# Patient Record
Sex: FEMALE | Race: WHITE | NOT HISPANIC OR LATINO | Employment: STUDENT | URBAN - METROPOLITAN AREA
[De-identification: names, ages, dates, MRNs, and addresses within clinical notes are randomized per-mention and may not be internally consistent; named-entity substitution may affect disease eponyms.]

---
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
## 2020-11-06 ENCOUNTER — OFFICE VISIT (OUTPATIENT)
Dept: URGENT CARE | Facility: CLINIC | Age: 20
End: 2020-11-06
Payer: COMMERCIAL

## 2020-11-06 VITALS
RESPIRATION RATE: 20 BRPM | HEIGHT: 71 IN | OXYGEN SATURATION: 97 % | WEIGHT: 146 LBS | HEART RATE: 96 BPM | BODY MASS INDEX: 20.44 KG/M2 | TEMPERATURE: 98.3 F

## 2020-11-06 DIAGNOSIS — Z11.59 SPECIAL SCREENING EXAMINATION FOR UNSPECIFIED VIRAL DISEASE: Primary | ICD-10-CM

## 2020-11-06 PROCEDURE — G0382 LEV 3 HOSP TYPE B ED VISIT: HCPCS | Performed by: PHYSICIAN ASSISTANT

## 2020-11-06 PROCEDURE — U0003 INFECTIOUS AGENT DETECTION BY NUCLEIC ACID (DNA OR RNA); SEVERE ACUTE RESPIRATORY SYNDROME CORONAVIRUS 2 (SARS-COV-2) (CORONAVIRUS DISEASE [COVID-19]), AMPLIFIED PROBE TECHNIQUE, MAKING USE OF HIGH THROUGHPUT TECHNOLOGIES AS DESCRIBED BY CMS-2020-01-R: HCPCS | Performed by: PHYSICIAN ASSISTANT

## 2020-11-08 LAB — SARS-COV-2 RNA SPEC QL NAA+PROBE: NOT DETECTED

## 2020-11-09 ENCOUNTER — TELEPHONE (OUTPATIENT)
Dept: URGENT CARE | Facility: CLINIC | Age: 20
End: 2020-11-09

## 2021-12-15 ENCOUNTER — OFFICE VISIT (OUTPATIENT)
Dept: FAMILY MEDICINE CLINIC | Facility: CLINIC | Age: 21
End: 2021-12-15
Payer: COMMERCIAL

## 2021-12-15 VITALS
BODY MASS INDEX: 18.27 KG/M2 | SYSTOLIC BLOOD PRESSURE: 110 MMHG | RESPIRATION RATE: 16 BRPM | HEART RATE: 88 BPM | HEIGHT: 72 IN | WEIGHT: 134.9 LBS | DIASTOLIC BLOOD PRESSURE: 62 MMHG

## 2021-12-15 DIAGNOSIS — N94.6 DYSMENORRHEA: ICD-10-CM

## 2021-12-15 DIAGNOSIS — Z00.00 ANNUAL PHYSICAL EXAM: Primary | ICD-10-CM

## 2021-12-15 PROBLEM — J45.909 ASTHMA: Status: RESOLVED | Noted: 2017-01-16 | Resolved: 2021-12-15

## 2021-12-15 PROCEDURE — 99395 PREV VISIT EST AGE 18-39: CPT | Performed by: PHYSICIAN ASSISTANT

## 2021-12-15 RX ORDER — NORETHINDRONE ACETATE AND ETHINYL ESTRADIOL 1MG-20(21)
1 KIT ORAL DAILY
Qty: 84 TABLET | Refills: 3 | Status: SHIPPED | OUTPATIENT
Start: 2021-12-15

## 2022-09-11 ENCOUNTER — OFFICE VISIT (OUTPATIENT)
Dept: URGENT CARE | Facility: CLINIC | Age: 22
End: 2022-09-11
Payer: COMMERCIAL

## 2022-09-11 VITALS
SYSTOLIC BLOOD PRESSURE: 116 MMHG | HEIGHT: 72 IN | HEART RATE: 116 BPM | RESPIRATION RATE: 18 BRPM | WEIGHT: 126.6 LBS | BODY MASS INDEX: 17.15 KG/M2 | OXYGEN SATURATION: 97 % | TEMPERATURE: 102.3 F | DIASTOLIC BLOOD PRESSURE: 72 MMHG

## 2022-09-11 DIAGNOSIS — J02.9 SORE THROAT: Primary | ICD-10-CM

## 2022-09-11 LAB
S PYO AG THROAT QL: NEGATIVE
SARS-COV-2 AG UPPER RESP QL IA: NEGATIVE
VALID CONTROL: NORMAL

## 2022-09-11 PROCEDURE — 87811 SARS-COV-2 COVID19 W/OPTIC: CPT

## 2022-09-11 PROCEDURE — 87070 CULTURE OTHR SPECIMN AEROBIC: CPT | Performed by: PHYSICIAN ASSISTANT

## 2022-09-11 PROCEDURE — 99213 OFFICE O/P EST LOW 20 MIN: CPT | Performed by: PHYSICIAN ASSISTANT

## 2022-09-11 PROCEDURE — 87147 CULTURE TYPE IMMUNOLOGIC: CPT | Performed by: PHYSICIAN ASSISTANT

## 2022-09-11 RX ORDER — AZITHROMYCIN 250 MG/1
TABLET, FILM COATED ORAL
Qty: 6 TABLET | Refills: 0 | Status: SHIPPED | OUTPATIENT
Start: 2022-09-11 | End: 2022-09-15

## 2022-09-11 NOTE — PROGRESS NOTES
NAME: Carrie Chaudhary is a 24 y o  female  : 2000    MRN: [id number]      Assessment and Plan   Sore throat [J02 9]  1  Sore throat  POCT rapid strepA    Poct Covid 19 Rapid Antigen Test    Throat culture    azithromycin (ZITHROMAX) 250 mg tablet      rapid strep negative, culture sent  COVID rapid negative    Based on [facility] criteria will treat with azithromycin  Discussed over-the-counter medications, fluids and rest   If no improvement follow-up PCP  ER if any changes or worsens  She acknowledges      Patient Instructions     Patient Instructions   Azithromycin as directed  Over-the-counter medications, fluids and rest   If no improvement follow-up with PCP    Proceed to ER if symptoms worsen  Chief Complaint     Chief Complaint   Patient presents with    Sore Throat     Pt reports sore throat/fever/body aches x 1d-denies n/v/diarrhea         History of Present Illness   Patient with no reported past medical history presents complaining of sore throat x1 day  States yesterday started with fevers, chills, body aches and sore throat  Denies any cough or congestion  Able to swallow the pain with swallowing  Over-the-counter medications not helping  Review of Systems   Review of Systems   Constitutional: Positive for chills and fever  HENT: Positive for sore throat  Negative for congestion  Respiratory: Negative for cough and shortness of breath  Cardiovascular: Negative for chest pain  Gastrointestinal: Negative for diarrhea, nausea and vomiting  Neurological: Negative for dizziness, weakness and light-headedness           Current Medications       Current Outpatient Medications:     azithromycin (ZITHROMAX) 250 mg tablet, Take 2 tablets today then 1 tablet daily x 4 days, Disp: 6 tablet, Rfl: 0    norethindrone-ethinyl estradiol (Loestrin Fe ) 1-20 MG-MCG per tablet, Take 1 tablet by mouth daily (Patient not taking: Reported on 2022), Disp: 84 tablet, Rfl: 3    Current Allergies     Allergies as of 09/11/2022 - Reviewed 09/11/2022   Allergen Reaction Noted    Molds & smuts  01/16/2017    Penicillins Rash 07/17/2018    Pollen extract  01/16/2017              Past Medical History:   Diagnosis Date    Pneumonia        History reviewed  No pertinent surgical history  Family History   Problem Relation Age of Onset    No Known Problems Mother     Hypertension Father     Alcohol abuse Father     Substance Abuse Neg Hx     Mental illness Neg Hx          Medications have been verified  The following portions of the patient's history were reviewed and updated as appropriate: allergies, current medications, past family history, past medical history, past social history, past surgical history and problem list     Objective   /72   Pulse (!) 116   Temp (!) 102 3 °F (39 1 °C)   Resp 18   Ht 6' (1 829 m)   Wt 57 4 kg (126 lb 9 6 oz)   SpO2 97%   BMI 17 17 kg/m²      Physical Exam     Physical Exam  Vitals and nursing note reviewed  Constitutional:       General: She is not in acute distress  Appearance: She is well-developed  She is not ill-appearing, toxic-appearing or diaphoretic  HENT:      Head:      Comments: TMs erythematous bilaterally without injection or bulging  Posterior oropharynx is erythematous with 2+ hypertrophic tonsils with white exudates  Uvula midline  Soft palate equal   Neck:      Comments: Bilateral tonsillar adenopathy  Cardiovascular:      Rate and Rhythm: Normal rate and regular rhythm  Pulmonary:      Effort: Pulmonary effort is normal  No respiratory distress  Musculoskeletal:      Cervical back: Normal range of motion  Skin:     Capillary Refill: Capillary refill takes less than 2 seconds  Neurological:      Mental Status: She is alert and oriented to person, place, and time

## 2022-09-11 NOTE — PATIENT INSTRUCTIONS
Azithromycin as directed  Over-the-counter medications, fluids and rest   If no improvement follow-up with PCP

## 2022-09-15 LAB — BACTERIA THROAT CULT: ABNORMAL

## 2022-09-20 ENCOUNTER — OFFICE VISIT (OUTPATIENT)
Dept: URGENT CARE | Facility: CLINIC | Age: 22
End: 2022-09-20
Payer: COMMERCIAL

## 2022-09-20 VITALS
RESPIRATION RATE: 16 BRPM | SYSTOLIC BLOOD PRESSURE: 134 MMHG | DIASTOLIC BLOOD PRESSURE: 78 MMHG | OXYGEN SATURATION: 99 % | HEART RATE: 104 BPM

## 2022-09-20 DIAGNOSIS — R50.9 FEVER, UNSPECIFIED FEVER CAUSE: Primary | ICD-10-CM

## 2022-09-20 DIAGNOSIS — J02.9 SORE THROAT: ICD-10-CM

## 2022-09-20 LAB
S PYO AG THROAT QL: NEGATIVE
SARS-COV-2 AG UPPER RESP QL IA: NEGATIVE
VALID CONTROL: NORMAL

## 2022-09-20 PROCEDURE — 87880 STREP A ASSAY W/OPTIC: CPT | Performed by: PHYSICIAN ASSISTANT

## 2022-09-20 PROCEDURE — 87070 CULTURE OTHR SPECIMN AEROBIC: CPT | Performed by: PHYSICIAN ASSISTANT

## 2022-09-20 PROCEDURE — 99213 OFFICE O/P EST LOW 20 MIN: CPT | Performed by: PHYSICIAN ASSISTANT

## 2022-09-20 PROCEDURE — 87811 SARS-COV-2 COVID19 W/OPTIC: CPT | Performed by: PHYSICIAN ASSISTANT

## 2022-09-20 RX ORDER — METHYLPREDNISOLONE 4 MG/1
TABLET ORAL
Qty: 1 EACH | Refills: 0 | Status: SHIPPED | OUTPATIENT
Start: 2022-09-20 | End: 2022-09-26 | Stop reason: ALTCHOICE

## 2022-09-20 RX ORDER — CLINDAMYCIN HYDROCHLORIDE 300 MG/1
300 CAPSULE ORAL 3 TIMES DAILY
Qty: 21 CAPSULE | Refills: 0 | Status: SHIPPED | OUTPATIENT
Start: 2022-09-20 | End: 2022-09-26 | Stop reason: ALTCHOICE

## 2022-09-20 NOTE — PATIENT INSTRUCTIONS
Patient was told rapid strep is negative will send for culture  Patient was educated I will prescribe steroids and antibiotics  Patient was told to eat on antibiotics  IF symptoms persist I recommend follow up with PCP  Pharyngitis   WHAT YOU NEED TO KNOW:   Pharyngitis, or sore throat, is inflammation of the tissues and structures in your pharynx (throat)  Pharyngitis is most often caused by bacteria  It may also be caused by a cold or flu virus  Other causes include smoking, allergies, or acid reflux  DISCHARGE INSTRUCTIONS:   Call 911 for any of the following: You have trouble breathing or swallowing because your throat is swollen or sore  Return to the emergency department if:   You are drooling because it hurts too much to swallow  Your fever is higher than 102? F (39?C) or lasts longer than 3 days  You are confused  You taste blood in your throat  Contact your healthcare provider if:   Your throat pain gets worse  You have a painful lump in your throat that does not go away after 5 days  Your symptoms do not improve after 5 days  You have questions or concerns about your condition or care  Medicines:  Viral pharyngitis will go away on its own without treatment  Your sore throat should start to feel better in 3 to 5 days for both viral and bacterial infections  You may need any of the following:  Antibiotics  treat a bacterial infection  NSAIDs , such as ibuprofen, help decrease swelling, pain, and fever  NSAIDs can cause stomach bleeding or kidney problems in certain people  If you take blood thinner medicine, always ask your healthcare provider if NSAIDs are safe for you  Always read the medicine label and follow directions  Acetaminophen  decreases pain and fever  It is available without a doctor's order  Ask how much to take and how often to take it  Follow directions  Acetaminophen can cause liver damage if not taken correctly  Take your medicine as directed  Contact your healthcare provider if you think your medicine is not helping or if you have side effects  Tell him or her if you are allergic to any medicine  Keep a list of the medicines, vitamins, and herbs you take  Include the amounts, and when and why you take them  Bring the list or the pill bottles to follow-up visits  Carry your medicine list with you in case of an emergency  Manage your symptoms:   Gargle salt water  Mix ¼ teaspoon salt in an 8 ounce glass of warm water and gargle  This may help decrease swelling in your throat  Drink liquids as directed  You may need to drink more liquids than usual  Liquids may help soothe your throat and prevent dehydration  Ask how much liquid to drink each day and which liquids are best for you  Use a cool-steam humidifier  to help moisten the air in your room and calm your cough  Soothe your throat  with cough drops, ice, soft foods, or popsicles  Prevent the spread of pharyngitis:  Cover your mouth and nose when you cough or sneeze  Do not share food or drinks  Wash your hands often  Use soap and water  If soap and water are unavailable, use an alcohol based hand   Follow up with your doctor as directed:  Write down your questions so you remember to ask them during your visits  © Copyright [employer] 2022 Information is for End User's use only and may not be sold, redistributed or otherwise used for commercial purposes  All illustrations and images included in CareNotes® are the copyrighted property of [employer]  or [employer] Law[employer]  The above information is an  only  It is not intended as medical advice for individual conditions or treatments  Talk to your doctor, nurse or pharmacist before following any medical regimen to see if it is safe and effective for you

## 2022-09-20 NOTE — LETTER
September 20, 2022     Patient: Tisha Gonzalez   YOB: 2000   Date of Visit: 9/20/2022       To Whom It May Concern: It is my medical opinion that Mckenzie Rico may return to work on once fever free for 24 hours  If you have any questions or concerns, please don't hesitate to call           Sincerely,        Elisa Nguyen PA-C    CC: No Recipients

## 2022-09-20 NOTE — PROGRESS NOTES
[facility]        NAME: Ken Gamez is a 24 y o  female  : 2000    MRN: [id number]  DATE: 2022  TIME: 4:53 PM    Assessment and Plan   Fever, unspecified fever cause [R50 9]  1  Fever, unspecified fever cause  Poct Covid 19 Rapid Antigen Test    Throat culture    clindamycin (CLEOCIN) 300 MG capsule    methylPREDNISolone 4 MG tablet therapy pack   2  Sore throat  POCT rapid strepA    Throat culture    clindamycin (CLEOCIN) 300 MG capsule    methylPREDNISolone 4 MG tablet therapy pack       Rapid COVID 19 and Rapid strep negative      Patient Instructions       Patient was told rapid strep is negative will send for culture  Patient was educated I will prescribe steroids and antibiotics  Patient was told to eat on antibiotics  IF symptoms persist I recommend follow up with PCP  Chief Complaint     Chief Complaint   Patient presents with    Sore Throat     Pt seen , positive strep A culture  Given abx, last dose Friday  Fever (101) and mild sore throat returned  History of Present Illness       Patient is here today complaining of sore throat pain  Patient reports sore throat started on 22 patient reports she took a Z-pack and had relief until yesterday  Patient reports sore throat pain returned  Patient is allergic to Pencillin  Denies any shortness of breath or trouble swallowing  Denies any history of diabetes      Review of Systems   Review of Systems   Constitutional: Positive for fever  HENT: Positive for congestion and sore throat  Respiratory: Negative  Cardiovascular: Negative  Neurological: Negative  Psychiatric/Behavioral: Negative            Current Medications       Current Outpatient Medications:     clindamycin (CLEOCIN) 300 MG capsule, Take 1 capsule (300 mg total) by mouth 3 (three) times a day for 7 days, Disp: 21 capsule, Rfl: 0    methylPREDNISolone 4 MG tablet therapy pack, Use as directed on package, Disp: 1 each, Rfl: 0    norethindrone-ethinyl estradiol (Loestrin Fe 1/20) 1-20 MG-MCG per tablet, Take 1 tablet by mouth daily (Patient not taking: Reported on 9/11/2022), Disp: 84 tablet, Rfl: 3    Current Allergies     Allergies as of 09/20/2022 - Reviewed 09/20/2022   Allergen Reaction Noted    Molds & smuts  01/16/2017    Penicillins Rash 07/17/2018    Pollen extract  01/16/2017            The following portions of the patient's history were reviewed and updated as appropriate: allergies, current medications, past family history, past medical history, past social history, past surgical history and problem list      Past Medical History:   Diagnosis Date    Pneumonia        History reviewed  No pertinent surgical history  Family History   Problem Relation Age of Onset    No Known Problems Mother     Hypertension Father     Alcohol abuse Father     Substance Abuse Neg Hx     Mental illness Neg Hx          Medications have been verified  Objective   /78   Pulse 104   Resp 16   SpO2 99%   No LMP recorded  Physical Exam     Physical Exam  Vitals and nursing note reviewed  Constitutional:       Appearance: Normal appearance  HENT:      Head: Normocephalic  Comments: No pain over frontal or maxillary sinus     Right Ear: Tympanic membrane, ear canal and external ear normal       Left Ear: Tympanic membrane, ear canal and external ear normal       Nose: Nose normal       Mouth/Throat:      Pharynx: Oropharyngeal exudate and posterior oropharyngeal erythema present  Eyes:      Extraocular Movements: Extraocular movements intact  Pupils: Pupils are equal, round, and reactive to light  Neck:      Comments: No palpable lymph nodes  Cardiovascular:      Rate and Rhythm: Normal rate and regular rhythm  Heart sounds: Normal heart sounds  Pulmonary:      Breath sounds: Normal breath sounds  No wheezing  Neurological:      General: No focal deficit present        Mental Status: She is alert and oriented to person, place, and time     Psychiatric:         Mood and Affect: Mood normal          Behavior: Behavior normal

## 2022-09-21 ENCOUNTER — TELEPHONE (OUTPATIENT)
Dept: URGENT CARE | Facility: CLINIC | Age: 22
End: 2022-09-21

## 2022-09-22 LAB — BACTERIA THROAT CULT: NORMAL

## 2022-09-23 ENCOUNTER — TELEPHONE (OUTPATIENT)
Dept: FAMILY MEDICINE CLINIC | Facility: CLINIC | Age: 22
End: 2022-09-23

## 2022-09-23 NOTE — TELEPHONE ENCOUNTER
Patient ws in the urgent care and was given clindamycin    She now has a rash  I told her to stop it    Patient has a penicillin allergy  She uses [facility]        [phone]

## 2022-09-23 NOTE — TELEPHONE ENCOUNTER
Her throat culture was negative for strep  It came in yesterday  She does not need any antibiotics  She can take Zyrtec 10 mg daily and add benadryl as needed to help with rash

## 2022-09-26 ENCOUNTER — OFFICE VISIT (OUTPATIENT)
Dept: FAMILY MEDICINE CLINIC | Facility: CLINIC | Age: 22
End: 2022-09-26
Payer: COMMERCIAL

## 2022-09-26 VITALS
HEIGHT: 72 IN | SYSTOLIC BLOOD PRESSURE: 116 MMHG | DIASTOLIC BLOOD PRESSURE: 70 MMHG | RESPIRATION RATE: 16 BRPM | WEIGHT: 125 LBS | HEART RATE: 73 BPM | BODY MASS INDEX: 16.93 KG/M2

## 2022-09-26 DIAGNOSIS — R63.4 WEIGHT LOSS: Primary | ICD-10-CM

## 2022-09-26 PROCEDURE — 99213 OFFICE O/P EST LOW 20 MIN: CPT | Performed by: PHYSICIAN ASSISTANT

## 2022-09-26 NOTE — PROGRESS NOTES
Assessment/Plan:    1  Weight loss - will do labs, discussed possibly starting an carlos manuel for calorie tracking as patient is most likely not meeting her caloric needs due to a busy active job  F/u pending labs and as needed    Subjective:   Chief Complaint   Patient presents with    Weight Loss      Patient ID: Ilya Hinkle is a 24 y o  female  Patient here complaining of weight loss without trying  Was at Urgent Care last week and was surprised to see how little she weighed  Over the past 2 years she has lost  Doesn't eat full meals, more of a grazer, eating every few hours, no breakfast  Recently tried to start breakfast like eggs, toast, cereal  Will eat lunch and dinner  Just small amounts, frequent  Not a picky eater  Is a , working 5 days a week on her feet  Does not exercise  11/2020 - 146 lb  12 2021 - 134 lb  9/2022 - 125 lbs      The following portions of the patient's history were reviewed and updated as appropriate: allergies, current medications, past family history, past medical history, past social history, past surgical history and problem list     Past Medical History:   Diagnosis Date    Pneumonia      History reviewed  No pertinent surgical history    Family History   Problem Relation Age of Onset    No Known Problems Mother     Hypertension Father     Alcohol abuse Father     Substance Abuse Neg Hx     Mental illness Neg Hx      Social History     Socioeconomic History    Marital status: Single     Spouse name: Not on file    Number of children: Not on file    Years of education: Not on file    Highest education level: Not on file   Occupational History    Not on file   Tobacco Use    Smoking status: Never Smoker    Smokeless tobacco: Never Used   Vaping Use    Vaping Use: Never used   Substance and Sexual Activity    Alcohol use: Yes     Comment: socially    Drug use: No    Sexual activity: Not on file   Other Topics Concern    Not on file   Social History Narrative    Always uses seat belt    Caffeine use: One cup a week    Has smoke detectors    Tea: Herbal tea once a week      Social Determinants of Health     Financial Resource Strain: Not on file   Food Insecurity: Not on file   Transportation Needs: Not on file   Physical Activity: Not on file   Stress: Not on file   Social Connections: Not on file   Intimate Partner Violence: Not on file   Housing Stability: Not on file     No current outpatient medications on file  Review of Systems          Objective:    Vitals:    09/26/22 1142   BP: 116/70   Pulse: 73   Resp: 16   Weight: 56 7 kg (125 lb)   Height: 5' 11 75" (1 822 m)        Physical Exam  Constitutional:       Appearance: Normal appearance  She is not ill-appearing or toxic-appearing  HENT:      Head: Normocephalic and atraumatic  Cardiovascular:      Rate and Rhythm: Normal rate and regular rhythm  Pulses: Normal pulses  Heart sounds: Normal heart sounds  Pulmonary:      Effort: Pulmonary effort is normal       Breath sounds: Normal breath sounds  Musculoskeletal:         General: Normal range of motion  Cervical back: Normal range of motion and neck supple  No tenderness  Lymphadenopathy:      Cervical: Cervical adenopathy present  Skin:     General: Skin is warm  Neurological:      General: No focal deficit present  Mental Status: She is alert and oriented to person, place, and time  Psychiatric:         Mood and Affect: Mood normal          Behavior: Behavior normal          Thought Content: Thought content normal          Judgment: Judgment normal              BMI Counseling: Body mass index is 17 07 kg/m²  The BMI is below normal  Patient was advised to gain weight

## 2025-01-03 ENCOUNTER — OFFICE VISIT (OUTPATIENT)
Dept: FAMILY MEDICINE CLINIC | Facility: CLINIC | Age: 25
End: 2025-01-03
Payer: COMMERCIAL

## 2025-01-03 VITALS
WEIGHT: 140.6 LBS | HEART RATE: 103 BPM | SYSTOLIC BLOOD PRESSURE: 124 MMHG | HEIGHT: 72 IN | OXYGEN SATURATION: 99 % | RESPIRATION RATE: 16 BRPM | DIASTOLIC BLOOD PRESSURE: 82 MMHG | TEMPERATURE: 98 F | BODY MASS INDEX: 19.05 KG/M2

## 2025-01-03 DIAGNOSIS — J02.9 SORE THROAT: ICD-10-CM

## 2025-01-03 DIAGNOSIS — J02.0 STREP PHARYNGITIS: Primary | ICD-10-CM

## 2025-01-03 LAB — S PYO AG THROAT QL: POSITIVE

## 2025-01-03 PROCEDURE — 99213 OFFICE O/P EST LOW 20 MIN: CPT | Performed by: STUDENT IN AN ORGANIZED HEALTH CARE EDUCATION/TRAINING PROGRAM

## 2025-01-03 PROCEDURE — 87880 STREP A ASSAY W/OPTIC: CPT | Performed by: STUDENT IN AN ORGANIZED HEALTH CARE EDUCATION/TRAINING PROGRAM

## 2025-01-03 RX ORDER — AZITHROMYCIN 250 MG/1
TABLET, FILM COATED ORAL
Qty: 6 TABLET | Refills: 0 | Status: SHIPPED | OUTPATIENT
Start: 2025-01-03 | End: 2025-01-08

## 2025-01-03 NOTE — PROGRESS NOTES
"Name: Diann Blackwell      : 2000      MRN: [id number]  Encounter Provider: Rena Lam MD  Encounter Date: 1/3/2025   Encounter department: [facility]  :  Assessment & Plan  Sore throat    Orders:    POCT rapid ANTIGEN strepA    Strep pharyngitis  Rapid strep in office is positive, will start patient on azithromycin antibiotic treatment given patient is allergic to penicillin  Patient to increase hydration, warm salt water gargles    Follow-up as needed if symptoms worsen or do not improve  Orders:    azithromycin (Zithromax) 250 mg tablet; Take 2 tablets (500 mg total) by mouth daily for 1 day, THEN 1 tablet (250 mg total) daily for 4 days.         History of Present Illness     Patient is a 24-year-old female who presents to the office today for sick visit.  Patient reports on New Year's Day she started noticing a sore throat and fever for which she took Advil.  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    Review of Systems   Constitutional:  Positive for fever.   HENT:  Positive for sore throat. Negative for congestion.    Respiratory:  Negative for cough and shortness of breath.        Objective   /82 (Patient Position: Sitting, Cuff Size: Standard)   Pulse 103   Temp 98 °F (36.7 °C) (Temporal)   Resp 16   Ht 5' 11.75\" (1.822 m)   Wt 63.8 kg (140 lb 9.6 oz)   SpO2 99%   BMI 19.20 kg/m²      Physical Exam  Vitals reviewed.   HENT:      Right Ear: Tympanic membrane and ear canal normal.      Left Ear: Tympanic membrane and ear canal normal.      Mouth/Throat:      Pharynx: Posterior oropharyngeal erythema present. No oropharyngeal exudate.   Cardiovascular:      Rate and Rhythm: Normal rate.      Heart sounds: Normal heart sounds.   Pulmonary:      Effort: Pulmonary effort is normal.   Neurological:      Mental Status: She is alert.   Administrative Statements   I have spent a total time of 20 minutes in caring for " this patient on the day of the visit/encounter including Risks and benefits of tx options, Patient and family education, Risk factor reductions, Documenting in the medical record, and Obtaining or reviewing history  .